# Patient Record
Sex: MALE
[De-identification: names, ages, dates, MRNs, and addresses within clinical notes are randomized per-mention and may not be internally consistent; named-entity substitution may affect disease eponyms.]

---

## 2023-12-05 PROBLEM — Z00.00 ENCOUNTER FOR PREVENTIVE HEALTH EXAMINATION: Status: ACTIVE | Noted: 2023-12-05

## 2024-01-10 ENCOUNTER — NON-APPOINTMENT (OUTPATIENT)
Age: 44
End: 2024-01-10

## 2024-01-10 ENCOUNTER — APPOINTMENT (OUTPATIENT)
Dept: VASCULAR SURGERY | Facility: CLINIC | Age: 44
End: 2024-01-10
Payer: MEDICAID

## 2024-01-10 VITALS
BODY MASS INDEX: 40.02 KG/M2 | SYSTOLIC BLOOD PRESSURE: 174 MMHG | HEART RATE: 93 BPM | DIASTOLIC BLOOD PRESSURE: 98 MMHG | HEIGHT: 67 IN | WEIGHT: 255 LBS

## 2024-01-10 DIAGNOSIS — I83.93 ASYMPTOMATIC VARICOSE VEINS OF BILATERAL LOWER EXTREMITIES: ICD-10-CM

## 2024-01-10 DIAGNOSIS — I78.1 NEVUS, NON-NEOPLASTIC: ICD-10-CM

## 2024-01-10 DIAGNOSIS — E66.01 MORBID (SEVERE) OBESITY DUE TO EXCESS CALORIES: ICD-10-CM

## 2024-01-10 DIAGNOSIS — I87.2 VENOUS INSUFFICIENCY (CHRONIC) (PERIPHERAL): ICD-10-CM

## 2024-01-10 DIAGNOSIS — L03.119 CELLULITIS OF UNSPECIFIED PART OF LIMB: ICD-10-CM

## 2024-01-10 DIAGNOSIS — E13.9 OTHER SPECIFIED DIABETES MELLITUS W/OUT COMPLICATIONS: ICD-10-CM

## 2024-01-10 DIAGNOSIS — E78.5 HYPERLIPIDEMIA, UNSPECIFIED: ICD-10-CM

## 2024-01-10 DIAGNOSIS — I87.8 OTHER SPECIFIED DISORDERS OF VEINS: ICD-10-CM

## 2024-01-10 DIAGNOSIS — M79.89 OTHER SPECIFIED SOFT TISSUE DISORDERS: ICD-10-CM

## 2024-01-10 DIAGNOSIS — I10 ESSENTIAL (PRIMARY) HYPERTENSION: ICD-10-CM

## 2024-01-10 DIAGNOSIS — Z78.9 OTHER SPECIFIED HEALTH STATUS: ICD-10-CM

## 2024-01-10 PROCEDURE — 93970 EXTREMITY STUDY: CPT

## 2024-01-10 PROCEDURE — 99203 OFFICE O/P NEW LOW 30 MIN: CPT

## 2024-01-10 RX ORDER — METFORMIN HYDROCHLORIDE 625 MG/1
TABLET ORAL
Refills: 0 | Status: ACTIVE | COMMUNITY

## 2024-01-10 RX ORDER — METOPROLOL TARTRATE 75 MG/1
TABLET, FILM COATED ORAL
Refills: 0 | Status: ACTIVE | COMMUNITY

## 2024-01-10 RX ORDER — ASCORBIC ACID 500 MG
TABLET ORAL
Refills: 0 | Status: ACTIVE | COMMUNITY

## 2024-01-10 RX ORDER — LOSARTAN POTASSIUM 100 MG/1
TABLET, FILM COATED ORAL
Refills: 0 | Status: ACTIVE | COMMUNITY

## 2024-01-16 NOTE — REVIEW OF SYSTEMS
[As noted in HPI] : as noted in HPI [Lower Ext Edema] : lower extremity edema [As Noted in HPI] : as noted in HPI

## 2024-01-17 NOTE — PROCEDURE
[FreeTextEntry1] : Venous duplex ordered to r/o insuff and eval vv, shows: B/l negative DVT. No reflux. RLE SSV non-compressible to mid-calf with possible chronic SVT or s/p ablation (confirmed w/ pt he has hx of vein procedure).

## 2024-01-17 NOTE — HISTORY OF PRESENT ILLNESS
[FreeTextEntry1] : 44 y/o M referred by [facility]. He has a PMHx of HTN, T2DM, HLD, obese (BMI 39.94), R SSV closure at OSH, and long-standing history of varicose veins, bilateral leg swelling, and recurrent cellulitis. He presents for a second opinion. Pt explains that he went to [facility]" and was recommended to obtain a stent placement for swelling of both legs. He is concerned about this and would like our opinion. He also endorses recurrent cellulitis and explains that he was initially diagnosed with cellulitis 20 yrs ago at [facility]. Since then, he had ~ 12 recurrent episodes. Most recent episode was last Friday on his R calf and he explains tit was painful. Pt reports leg swelling occurs mainly on his R leg and that it is worse at night. He wears compression stockings regularly (R: 30-40 mmHg, L: 20-30 mmHg). Denies any leg heaviness or tiredness, wounds, SOB or CP. He is on his feet a lot given he works as a . No history of bleeding or clotting disorders. Additionally, denies any claudication symptoms or rest pain.  SHx: - Never smoker   FHx: - Father: ? vascular condition (wears stockings).   Works as a .

## 2024-01-17 NOTE — PHYSICAL EXAM
[Ankle Swelling (On Exam)] : present [Varicose Veins Of Lower Extremities] : present [Ankle Swelling On The Right] : mild [] : present [Ankle Swelling On The Left] : moderate [1+] : left 1+ [2+] : left 2+ [de-identified] : WN/WD [FreeTextEntry1] : Skin on both calves with moderate hyperpigmentation changes c/w venous stasis discoloration. Bilt moderate edema (R>L). RLE with mild bulging varicose vein on the anterior aspect of the calf and LLE with varicose veins on the medial aspect of the thigh down to the calf. Scattered spider veins, mainly on the ankles.  [de-identified] : Obese body habitus [de-identified] : FROM

## 2024-01-17 NOTE — CONSULT LETTER
[Dear  ___] : Dear  [unfilled], [FreeTextEntry2] : Hayes Vera MD [address] [place], [place] [id number] [FreeTextEntry1] : I saw Mr Masoud Flowers for a second opinion. He had a vascular evaluation at another location and was offered stent placement to improve his swelling. His English is somewhat limited but brings records with him. He also explains having recurrent episodes of cellulitis on the right leg. Denies any wounds, leg trauma, history of bleeding or clotting disorders, or any symptoms consistent with arterial insufficiency. He does reports chronic swelling, worse on the right leg. He wears compression stockings daily and works as a .   On exam, legs well perfused with palpable pulses. Skin dry on both calves has moderate hyperpigmentation changes consistent with venous stasis. Bilateral moderate edema, worse on the right side. Mild bulging varicose vein on the right anterior aspect of the calf and some on the left medial aspect of the thigh down to the calf. Scattered spider veins, mainly at the ankles. Obese body habitus.  Venous duplex today int the office shows no evidence of DVT or reflux. The right smaller saphenous vein appears to have been treated in the past.  I reviewed findings with Mr Flowers. No vascular surgeries or interventions are recommended or indicated. I stressed the importance of moisturizing skin daily and wearing  compression stockings daily.  Moisturizing the skin is probably most helpful in preventing the recurrent episodes of cellulitis.  I see no indication for iliac venous stenting.  He may see me as needed.   My complete EMR office note is below for your records.  [FreeTextEntry3] : Sincerely,   Eduardo Butcher M.D.  , [facility] Surgeon-in-Chief, Formerly [facility] Professor of Surgery, [employer] at [facility]

## 2024-01-17 NOTE — ASSESSMENT
[FreeTextEntry1] : 44 y/o M w/ recurrent cellulitis, long standing history of varicose veins, R SSV closure at OSH, chronic venous insufficiency, obesity (BMI 39.94), and bilt edema.  On exam, legs well perfused with palpable fem/pop and pedal pulses. Skin dry on both calves with moderate hyperpigmentation changes c/w venous stasis discoloration. Bilt moderate edema (R>L). RLE with mild bulging varicose vein on the anterior aspect of the calf and LLE with varicose veins on the medial aspect of the thigh down to the calf. Scattered spider veins, mainly on the ankles.  Venous duplex showed bilateral negative DVT. No reflux. RLE SSV non-compressible to mid calf s/p ablation.    Findings discussed with pt. No vascular intervention/surgery is required, indicated or recommended at this time. He was recommended to moisturize his skin daily with mineral oil after showering to prevent cellulitis reoccurrence. Continue to wear compression stockings and rest with leg above chest level as much as he can. Emphasized the importance of losing weight to help reduce overall leg symptoms. Walking encouraged on a daily basis and avoid standing or sitting down for too long. Reassured also of no evidence of arterial insufficiency. F/u prn.

## 2024-01-17 NOTE — ADDENDUM
[FreeTextEntry1] : I, Dr. Eduardo Butcher, personally performed the evaluation and management (E/M) services for this new patient.  That E/M includes conducting the initial examination, assessing all conditions, and establishing the plan of care.  Today, my ACP, Kylee Moon NP, was here to observe my evaluation and management services for this patient to be followed going forward.  The documentation for this encounter was entered by Lucero Gallardo acting as a scribe for Dr. Eduardo Butcher.